# Patient Record
Sex: FEMALE | Race: WHITE | NOT HISPANIC OR LATINO
[De-identification: names, ages, dates, MRNs, and addresses within clinical notes are randomized per-mention and may not be internally consistent; named-entity substitution may affect disease eponyms.]

---

## 2017-01-24 ENCOUNTER — TRANSCRIPTION ENCOUNTER (OUTPATIENT)
Age: 52
End: 2017-01-24

## 2017-02-23 ENCOUNTER — RESULT REVIEW (OUTPATIENT)
Age: 52
End: 2017-02-23

## 2017-02-24 ENCOUNTER — OUTPATIENT (OUTPATIENT)
Dept: OUTPATIENT SERVICES | Facility: HOSPITAL | Age: 52
LOS: 1 days | Discharge: HOME | End: 2017-02-24
Payer: MEDICAID

## 2017-02-24 DIAGNOSIS — M20.11 HALLUX VALGUS (ACQUIRED), RIGHT FOOT: ICD-10-CM

## 2017-02-24 DIAGNOSIS — Z87.891 PERSONAL HISTORY OF NICOTINE DEPENDENCE: ICD-10-CM

## 2017-02-24 DIAGNOSIS — Z88.0 ALLERGY STATUS TO PENICILLIN: ICD-10-CM

## 2017-02-24 PROCEDURE — 73630 X-RAY EXAM OF FOOT: CPT | Mod: 26,RT

## 2017-02-27 LAB — SURGICAL PATHOLOGY STUDY: SIGNIFICANT CHANGE UP

## 2018-08-22 ENCOUNTER — TRANSCRIPTION ENCOUNTER (OUTPATIENT)
Age: 53
End: 2018-08-22

## 2018-12-17 ENCOUNTER — TRANSCRIPTION ENCOUNTER (OUTPATIENT)
Age: 53
End: 2018-12-17

## 2018-12-21 ENCOUNTER — TRANSCRIPTION ENCOUNTER (OUTPATIENT)
Age: 53
End: 2018-12-21

## 2019-12-15 ENCOUNTER — TRANSCRIPTION ENCOUNTER (OUTPATIENT)
Age: 54
End: 2019-12-15

## 2020-12-04 ENCOUNTER — TRANSCRIPTION ENCOUNTER (OUTPATIENT)
Age: 55
End: 2020-12-04

## 2021-03-12 ENCOUNTER — TRANSCRIPTION ENCOUNTER (OUTPATIENT)
Age: 56
End: 2021-03-12

## 2022-03-24 ENCOUNTER — TRANSCRIPTION ENCOUNTER (OUTPATIENT)
Age: 57
End: 2022-03-24

## 2022-07-24 ENCOUNTER — NON-APPOINTMENT (OUTPATIENT)
Age: 57
End: 2022-07-24

## 2022-09-04 ENCOUNTER — NON-APPOINTMENT (OUTPATIENT)
Age: 57
End: 2022-09-04

## 2022-09-11 ENCOUNTER — NON-APPOINTMENT (OUTPATIENT)
Age: 57
End: 2022-09-11

## 2024-01-18 ENCOUNTER — EMERGENCY (EMERGENCY)
Facility: HOSPITAL | Age: 59
LOS: 1 days | Discharge: HOME | End: 2024-01-18
Admitting: EMERGENCY MEDICINE
Payer: COMMERCIAL

## 2024-01-18 VITALS
OXYGEN SATURATION: 96 % | WEIGHT: 130.07 LBS | TEMPERATURE: 98 F | HEART RATE: 80 BPM | SYSTOLIC BLOOD PRESSURE: 132 MMHG | RESPIRATION RATE: 16 BRPM | HEIGHT: 61 IN | DIASTOLIC BLOOD PRESSURE: 86 MMHG
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
## 2024-01-18 DIAGNOSIS — M25.561 PAIN IN RIGHT KNEE: ICD-10-CM

## 2024-01-18 DIAGNOSIS — Z88.0 ALLERGY STATUS TO PENICILLIN: ICD-10-CM
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
## 2024-01-18 DIAGNOSIS — S80.911A UNSPECIFIED SUPERFICIAL INJURY OF RIGHT KNEE, INITIAL ENCOUNTER: ICD-10-CM

## 2024-01-18 PROCEDURE — 99283 EMERGENCY DEPT VISIT LOW MDM: CPT | Mod: 25

## 2024-01-18 PROCEDURE — 73564 X-RAY EXAM KNEE 4 OR MORE: CPT

## 2024-01-18 PROCEDURE — 99284 EMERGENCY DEPT VISIT MOD MDM: CPT

## 2024-01-18 PROCEDURE — 73564 X-RAY EXAM KNEE 4 OR MORE: CPT | Mod: 26,RT

## 2024-01-18 RX ORDER — TRAMADOL HYDROCHLORIDE 50 MG/1
50 TABLET ORAL ONCE
Refills: 0 | Status: DISCONTINUED | OUTPATIENT
Start: 2024-01-18 | End: 2024-01-18

## 2024-01-18 RX ORDER — TRAMADOL HYDROCHLORIDE 50 MG/1
1 TABLET ORAL
Qty: 4 | Refills: 0
Start: 2024-01-18

## 2024-01-18 RX ADMIN — TRAMADOL HYDROCHLORIDE 50 MILLIGRAM(S): 50 TABLET ORAL at 18:53

## 2024-01-18 RX ADMIN — TRAMADOL HYDROCHLORIDE 50 MILLIGRAM(S): 50 TABLET ORAL at 19:26

## 2024-01-18 NOTE — ED PROVIDER NOTE - PHYSICAL EXAMINATION
Gen: well appearing, no acute distress  Skin: warm/dry, no rash noted  Resp: breathing comfortably, speaking in full sentences, no dyspnea  RLE: + suprapatellar effusion w/ + valgus stress, no joint line ttp, FROM hip/ankle digits, pain limited ROM knee but able to SLR, DP/PT pulse 2+, SILT, cap refill < 2 sec.  suspect tendon/ligament injury, ?MCL.  will obtain xray to r/o fx/dislocation.  tramadol for pain.    Neuro: alert/oriented, able to bear wt but difficulty ambulating 2/2 pain

## 2024-01-18 NOTE — ED ADULT NURSE NOTE - NSSEPSISSUSPECTED_ED_A_ED
[de-identified] : sinus pressure [FreeTextEntry6] : Describes pressure under right eye for 3-4 days\par no fever\par no cough\par some sneezing\par no congestion No

## 2024-01-18 NOTE — ED PROVIDER NOTE - CLINICAL SUMMARY MEDICAL DECISION MAKING FREE TEXT BOX
58 F denies pmh p/w R knee pain s/p twisting injury; no direct trauma.  on exam vss, difficulty ambulating 2/2 pain, RLE: + suprapatellar effusion w/ + valgus stress, no joint line ttp, FROM hip/ankle digits, pain limited ROM knee but able to SLR, DP/PT pulse 2+, SILT, cap refill < 2 sec.  suspect tendon/ligament injury, ?MCL.  will obtain xray to r/o fx/dislocation.  tramadol for pain.      xray shows no e/o fx or dislocation.  pt w/ difficulty walking 2/2 pain but no direct trauma so do not need to obtain ct.  will give ace/crutches and recommend rice/outpt ortho f/u.  discussed strict return parameters

## 2024-01-18 NOTE — ED PROVIDER NOTE - PATIENT PORTAL LINK FT
You can access the FollowMyHealth Patient Portal offered by [facility] by registering at the following website: [email or web address]. By joining [employer]’s FollowMyHealth portal, you will also be able to view your health information using other applications (apps) compatible with our system.

## 2024-01-18 NOTE — ED ADULT TRIAGE NOTE - CHIEF COMPLAINT QUOTE
Pt presents to ED c/o right knee pain since this afternoon. [place] the train, twisted knee wrong. Pain worse with ambulation.

## 2024-01-18 NOTE — ED ADULT NURSE REASSESSMENT NOTE - NS ED NURSE REASSESS COMMENT FT1
ACE bandage applied to right knee, crutch teaching done w/ return demonstration.  Pain improved s/p Tramadol.  Discharged to home in stable condition.

## 2024-01-18 NOTE — ED PROVIDER NOTE - NSFOLLOWUPINSTRUCTIONS_ED_ALL_ED_FT
Take tylenol 650mg or motrin 400-800mg as needed every 4-6 hours for pain.   Take tramadol if you have severe pain   REST- Rest your hurting/injured joint or extremity to decrease pain and swelling for 24-48 hours    ICE- Apply ice to area of pain to decreased inflammation and pain, put towel/barrier between ice and skin. You can keep ice on for 20 minutes at a time 4-8 times daily   COMPRESSION- Wear ace wrap or brace for support to reduce swelling.  Make sure not to wrap too tight, loosen if skin feeling numb/tingling or skin turns blue   ELEVATION- Elevate hurting/injured area 6 or more inches about level of heart to decrease swelling/inflammation.  Use pillow under joint to elevate area    [facility] (Thursday afternoons) - call [phone] to schedule    You can use crutches to walk but is pain is tolerable you can weight bear as tolerated.    Knee Sprain, Adult  A person's knee joint, with a close-up of the ligament.  A knee sprain is a stretch or tear in a knee ligament. Knee ligaments are tissues that connect the bones of the knee joint to each other.    What are the causes?  This condition often results from:  A fall.  An injury to the knee.  What are the signs or symptoms?  Symptoms of this condition include:  Trouble straightening or bending the leg.  Swelling in the knee.  Bruising around the knee.  Tenderness or pain in the knee.  Sudden muscle tightening (spasm) around the knee.  How is this diagnosed?  This condition may be diagnosed based on:  A physical exam.  A history of what happened just before you started to have symptoms.  Tests. These may include:  An X-ray. This may be done to make sure no bones are broken or moved out of position (dislocated).  An MRI. This may be done to check if any of the ligaments are torn and to check for other damage.  A physical exam that includes stress testing of the knee. This may be done to check for damage to ligaments.  How is this treated?  Treatment for this condition may involve:  Keeping the knee in a straightened position (immobilized) with a cast, brace, or splint.  Applying ice to the knee. This helps with pain and swelling.  Raising (elevating) the knee above the level of your heart when you are resting. This helps with pain and swelling.  Taking medicine for pain.  Doing exercises to prevent or limit long-term weakness or stiffness in your knee.  Having surgery to reconnect ligaments to the bone or to reconstruct ligaments. This may be needed if one or more ligaments are fully torn.  Follow these instructions at home:  If you have a removable splint or brace:    Wear the splint or brace as told by your health care provider. Remove it only as told by your health care provider.  Check the skin around the splint or brace every day. Tell your health care provider about any concerns.  Loosen the splint or brace if your toes tingle, become numb, or turn cold and blue.  Keep the splint or brace clean and dry.  If you have a nonremovable cast:    Do not put pressure on any part of the cast until it is fully hardened. This may take several hours.  Do not stick anything inside the cast to scratch your skin. Doing that increases your risk of infection.  Check the skin around the cast every day. Tell your health care provider about any concerns.  You may put lotion on dry skin around the edges of the cast. Do not put lotion on the skin underneath the cast.  Keep the cast clean and dry.  Bathing    If the splint, brace, or cast is not waterproof:  Do not let it get wet.  Cover it with a watertight covering when you take a bath or a shower.  Managing pain, stiffness, and swelling    A bag of ice on a towel on the skin.   If directed, put ice on the injured area. To do this:  If you have a removable splint or brace, remove it as told by your health care provider.  Put ice in a plastic bag.  Place a towel between your skin and the bag or between your cast and the bag.  Leave the ice on for 20 minutes, 2–3 times a day.  If your skin turns bright red, remove the ice right away to prevent skin damage. The risk of skin damage is higher if you cannot feel pain, heat, or cold.  Move your toes often to reduce stiffness and swelling.  Elevate the injured area above the level of your heart while you are sitting or lying down.  General instructions    Take over-the-counter and prescription medicines only as told by your health care provider.  Do not use any products that contain nicotine or tobacco. These products include cigarettes, chewing tobacco, and vaping devices, such as e-cigarettes. These can delay healing. If you need help quitting, ask your health care provider.  Do exercises as told by your health care provider.  Contact a health care provider if:  You have pain that gets worse.  The cast, brace, or splint does not fit right or gets damaged.  Get help right away if:  You cannot use your injured knee to support any of your body weight (cannot bear weight).  You cannot move the injured joint.  You cannot walk more than a few steps without pain or without your knee buckling.  You have a lot of pain, swelling, or numbness in the leg below the cast, brace, or splint.  Your foot or toes are numb, cold, or blue after you loosen your splint or brace.  This information is not intended to replace advice given to you by your health care provider. Make sure you discuss any questions you have with your health care provider.

## 2024-01-18 NOTE — ED PROVIDER NOTE - OBJECTIVE STATEMENT
58 F denies pmh p/w R knee pain s/p twisting injury.  pt reports [place] and twisted the R knee and felt immediate pain over anterior and medial knee w/ difficulty ambulating.  took aleve w/o relief.  reports knee is now swollen but denies any direct trauma.  denies f/c, numbness/weaknss, calf pain/swelling, pain in ankle/hip or other injuries.